# Patient Record
(demographics unavailable — no encounter records)

---

## 2024-11-20 NOTE — PHYSICAL EXAM
[PERRL] : pupils equal round and reactive to light [EOMI] : extraocular movements intact [Supple] : supple [No Respiratory Distress] : no respiratory distress  [No Accessory Muscle Use] : no accessory muscle use [Clear to Auscultation] : lungs were clear to auscultation bilaterally [Normal Rate] : normal rate  [Regular Rhythm] : with a regular rhythm [Normal S1, S2] : normal S1 and S2 [Pedal Pulses Present] : the pedal pulses are present [No Edema] : there was no peripheral edema [Soft] : abdomen soft [Non Tender] : non-tender [Non-distended] : non-distended [Normal Supraclavicular Nodes] : no supraclavicular lymphadenopathy [Normal Posterior Cervical Nodes] : no posterior cervical lymphadenopathy [Normal Anterior Cervical Nodes] : no anterior cervical lymphadenopathy [No CVA Tenderness] : no CVA  tenderness [No Spinal Tenderness] : no spinal tenderness [No Rash] : no rash [Normal] : normal gait, coordination grossly intact, no focal deficits and deep tendon reflexes were 2+ and symmetric [Normal Affect] : the affect was normal [Alert and Oriented x3] : oriented to person, place, and time [Normal Mood] : the mood was normal

## 2024-11-22 NOTE — HISTORY OF PRESENT ILLNESS
[FreeTextEntry1] : CPE. [de-identified] : Patient is a 25-year-old F with PMHx of VENECIA and abnormal pap coming in for CPE.  Patient reports that she had BV infection in June 2024. s/p treatment. She denies vaginal discharge, foul vaginal odor, denies burning and denies vaginal itching. Interested to get STI screen.  Patient is sexually active in monogamous relationship. using Nexplanon implant for contraception.   Preventative Care- Cervical Cancer Screen- 07/2022- Negative for Intraepithelial lesions or malignancy  Immunization- Covid-UTD Tdap-2020 Influenza- will administer today

## 2024-11-22 NOTE — HISTORY OF PRESENT ILLNESS
[FreeTextEntry1] : CPE. [de-identified] : Patient is a 25-year-old F with PMHx of VENECIA and abnormal pap coming in for CPE.  Patient reports that she had BV infection in June 2024. s/p treatment. She denies vaginal discharge, foul vaginal odor, denies burning and denies vaginal itching. Interested to get STI screen.  Patient is sexually active in monogamous relationship. using Nexplanon implant for contraception.   Preventative Care- Cervical Cancer Screen- 07/2022- Negative for Intraepithelial lesions or malignancy  Immunization- Covid-UTD Tdap-2020 Influenza- will administer today

## 2024-11-22 NOTE — HEALTH RISK ASSESSMENT
[Good] : ~his/her~  mood as  good [Yes] : Yes [Monthly or less (1 pt)] : Monthly or less (1 point) [1 or 2 (0 pts)] : 1 or 2 (0 points) [Never (0 pts)] : Never (0 points) [No] : In the past 12 months have you used drugs other than those required for medical reasons? No [No falls in past year] : Patient reported no falls in the past year [0] : 2) Feeling down, depressed, or hopeless: Not at all (0) [Never] : Never [Patient reported PAP Smear was normal] : Patient reported PAP Smear was normal [HIV Test offered] : HIV Test offered [Hepatitis C test offered] : Hepatitis C test offered [With Family] : lives with family [Student] : student [Significant Other] : lives with significant other [Sexually Active] : sexually active [Fully functional (bathing, dressing, toileting, transferring, walking, feeding)] : Fully functional (bathing, dressing, toileting, transferring, walking, feeding) [Fully functional (using the telephone, shopping, preparing meals, housekeeping, doing laundry, using] : Fully functional and needs no help or supervision to perform IADLs (using the telephone, shopping, preparing meals, housekeeping, doing laundry, using transportation, managing medications and managing finances) [Audit-CScore] : 1 [[id number]] : 0 [Reports changes in hearing] : Reports no changes in hearing [Reports changes in vision] : Reports no changes in vision [Reports changes in dental health] : Reports no changes in dental health [PapSmearDate] : 2022 [de-identified] : mother, brother and stepfather [FreeTextEntry2] : PADMA

## 2024-11-22 NOTE — ASSESSMENT
[FreeTextEntry1] : #HCM Routine blood work and STI screen ordered   Preventative Care- Cervical Cancer Screen- 07/2022- Negative for Intraepithelial lesions or malignancy  Immunization- Covid-UTD Tdap-2020 Influenza- will administer today  All questions and concerns addressed.